# Patient Record
Sex: FEMALE | URBAN - METROPOLITAN AREA
[De-identification: names, ages, dates, MRNs, and addresses within clinical notes are randomized per-mention and may not be internally consistent; named-entity substitution may affect disease eponyms.]

---

## 2017-02-08 ENCOUNTER — IMPORTED ENCOUNTER (OUTPATIENT)
Dept: URBAN - METROPOLITAN AREA CLINIC 1 | Facility: CLINIC | Age: 47
End: 2017-02-08

## 2017-02-08 PROBLEM — H11.151: Noted: 2017-02-08

## 2017-02-08 PROBLEM — H40.013: Noted: 2017-02-08

## 2017-02-08 PROBLEM — H04.123: Noted: 2017-02-08

## 2017-02-08 PROCEDURE — 92014 COMPRE OPH EXAM EST PT 1/>: CPT

## 2017-02-08 PROCEDURE — 92133 CPTRZD OPH DX IMG PST SGM ON: CPT

## 2017-09-06 ENCOUNTER — IMPORTED ENCOUNTER (OUTPATIENT)
Dept: URBAN - METROPOLITAN AREA CLINIC 1 | Facility: CLINIC | Age: 47
End: 2017-09-06

## 2017-09-06 PROBLEM — H04.123: Noted: 2017-09-06

## 2017-09-06 PROBLEM — H40.013: Noted: 2017-09-06

## 2017-09-06 PROCEDURE — 99213 OFFICE O/P EST LOW 20 MIN: CPT

## 2017-09-06 PROCEDURE — 92083 EXTENDED VISUAL FIELD XM: CPT

## 2018-03-14 ENCOUNTER — IMPORTED ENCOUNTER (OUTPATIENT)
Dept: URBAN - METROPOLITAN AREA CLINIC 1 | Facility: CLINIC | Age: 48
End: 2018-03-14

## 2018-03-14 PROBLEM — H11.151: Noted: 2018-03-14

## 2018-03-14 PROBLEM — H40.1231: Noted: 2018-03-14

## 2018-03-14 PROBLEM — H04.123: Noted: 2018-03-14

## 2018-03-14 PROCEDURE — 92133 CPTRZD OPH DX IMG PST SGM ON: CPT

## 2018-03-14 PROCEDURE — 92015 DETERMINE REFRACTIVE STATE: CPT

## 2018-03-14 PROCEDURE — 92014 COMPRE OPH EXAM EST PT 1/>: CPT

## 2018-03-28 ENCOUNTER — IMPORTED ENCOUNTER (OUTPATIENT)
Dept: URBAN - METROPOLITAN AREA CLINIC 1 | Facility: CLINIC | Age: 48
End: 2018-03-28

## 2018-03-28 PROBLEM — H40.1231: Noted: 2018-03-28

## 2018-03-28 PROCEDURE — 76514 ECHO EXAM OF EYE THICKNESS: CPT

## 2018-03-28 PROCEDURE — 99213 OFFICE O/P EST LOW 20 MIN: CPT

## 2018-04-20 ENCOUNTER — IMPORTED ENCOUNTER (OUTPATIENT)
Dept: URBAN - METROPOLITAN AREA CLINIC 1 | Facility: CLINIC | Age: 48
End: 2018-04-20

## 2018-04-20 PROBLEM — H04.123: Noted: 2018-04-20

## 2018-04-20 PROBLEM — H40.1231: Noted: 2018-04-20

## 2018-04-20 PROCEDURE — 99213 OFFICE O/P EST LOW 20 MIN: CPT

## 2019-05-22 ENCOUNTER — IMPORTED ENCOUNTER (OUTPATIENT)
Dept: URBAN - METROPOLITAN AREA CLINIC 1 | Facility: CLINIC | Age: 49
End: 2019-05-22

## 2019-05-22 PROBLEM — H04.123: Noted: 2019-05-22

## 2019-05-22 PROBLEM — H40.1231: Noted: 2019-05-22

## 2019-05-22 PROBLEM — H16.143: Noted: 2019-05-22

## 2019-05-22 PROCEDURE — 92014 COMPRE OPH EXAM EST PT 1/>: CPT

## 2019-05-22 PROCEDURE — 92133 CPTRZD OPH DX IMG PST SGM ON: CPT

## 2019-05-22 PROCEDURE — 92015 DETERMINE REFRACTIVE STATE: CPT

## 2019-09-25 ENCOUNTER — IMPORTED ENCOUNTER (OUTPATIENT)
Dept: URBAN - METROPOLITAN AREA CLINIC 1 | Facility: CLINIC | Age: 49
End: 2019-09-25

## 2019-09-25 PROBLEM — H40.1231: Noted: 2019-09-25

## 2019-09-25 PROCEDURE — 92012 INTRM OPH EXAM EST PATIENT: CPT

## 2019-09-25 PROCEDURE — 92083 EXTENDED VISUAL FIELD XM: CPT

## 2020-01-22 ENCOUNTER — IMPORTED ENCOUNTER (OUTPATIENT)
Dept: URBAN - METROPOLITAN AREA CLINIC 1 | Facility: CLINIC | Age: 50
End: 2020-01-22

## 2020-01-22 PROBLEM — H40.1231: Noted: 2020-01-22

## 2020-01-22 PROBLEM — H04.123: Noted: 2020-01-22

## 2020-01-22 PROBLEM — H11.151: Noted: 2020-01-22

## 2020-01-22 PROCEDURE — 92015 DETERMINE REFRACTIVE STATE: CPT

## 2020-01-22 PROCEDURE — 92012 INTRM OPH EXAM EST PATIENT: CPT

## 2021-08-04 ENCOUNTER — IMPORTED ENCOUNTER (OUTPATIENT)
Dept: URBAN - METROPOLITAN AREA CLINIC 1 | Facility: CLINIC | Age: 51
End: 2021-08-04

## 2021-08-04 PROBLEM — H52.4: Noted: 2021-08-04

## 2021-08-04 PROBLEM — H52.13: Noted: 2021-08-04

## 2021-08-04 PROCEDURE — S0621 ROUTINE OPHTHALMOLOGICAL EXA: HCPCS

## 2022-03-19 PROBLEM — K56.609 SMALL BOWEL OBSTRUCTION (HCC): Status: ACTIVE | Noted: 2021-04-27

## 2022-03-19 PROBLEM — R10.9 ABDOMINAL PAIN OF UNKNOWN ETIOLOGY: Status: ACTIVE | Noted: 2021-04-28

## 2022-03-19 PROBLEM — K56.609 BOWEL OBSTRUCTION (HCC): Status: ACTIVE | Noted: 2018-07-28

## 2022-03-19 PROBLEM — K56.609 SBO (SMALL BOWEL OBSTRUCTION) (HCC): Status: ACTIVE | Noted: 2018-07-28

## 2022-04-09 ASSESSMENT — TONOMETRY
OS_IOP_MMHG: 13
OD_IOP_MMHG: 12
OS_IOP_MMHG: 13
OS_IOP_MMHG: 10
OD_IOP_MMHG: 11
OD_IOP_MMHG: 13
OS_IOP_MMHG: 16
OD_IOP_MMHG: 10
OD_IOP_MMHG: 10
OD_IOP_MMHG: 16
OD_IOP_MMHG: 12
OS_IOP_MMHG: 10
OD_IOP_MMHG: 13
OS_IOP_MMHG: 13
OS_IOP_MMHG: 12
OS_IOP_MMHG: 14
OD_IOP_MMHG: 13
OS_IOP_MMHG: 12

## 2022-04-09 ASSESSMENT — VISUAL ACUITY
OS_SC: 20/30-1
OD_SC: 20/20-1
OD_CC: 20/20-1
OS_CC: 20/30
OD_CC: 20/30
OS_CC: 20/20
OD_CC: J1+
OD_CC: 20/30
OS_CC: J1+
OS_CC: 20/20
OD_SC: 20/20
OD_CC: 20/20
OS_CC: 20/25
OD_CC: 20/20
OS_CC: 20/30
OD_CC: 20/20
OS_SC: 20/20-1
OS_SC: 20/20
OS_CC: 20/20-2
OS_CC: J1+
OD_SC: 20/60
OS_CC: 20/30
OD_CC: J1+
OD_CC: 20/20-2
OD_CC: 20/25
OS_CC: 20/20

## 2023-11-21 ENCOUNTER — HOSPITAL ENCOUNTER (OUTPATIENT)
Facility: HOSPITAL | Age: 53
Setting detail: RECURRING SERIES
Discharge: HOME | End: 2023-11-24
Payer: COMMERCIAL

## 2023-11-21 PROCEDURE — 97161 PT EVAL LOW COMPLEX 20 MIN: CPT

## 2023-11-21 NOTE — PROGRESS NOTES
[address] [place] [address] PHYSICAL THERAPY  [address] [facility] 1, 1 [facility], [address] Phone: [phone] Fax [phone]  Plan of Care / Statement of Necessity for Physical Therapy Services     Patient Name: Davis Monzon : 1970   Medical   Diagnosis: *Chondrocostal junction syndrome (tietze) [M94.0] Treatment Diagnosis: Chondrocostal junction syndrome (tietze) [M94.0]   Onset Date: 2023 Payor: Payor: [employer] / Plan: Andreia [employer]os PPO / Product Type: *No Product type* /    Referral Source: [place] Dears [facility]): 2023   Prior Hospitalization: See medical history Provider #: [id number]   Prior Level of Function: Full functions   Comorbidities: none     Assessment / key information:    SUBJECTIVE  Chief Complaint: Pt reports that she was diagnosed with costochondritis approximately 11 years ago. Pt notes since then she has had flare ups 3-4 times per year, which resolved after a week. Pt reports she experienced a flare up in September, which has continued the last three months. Pt notes the symptoms radiate down her L UE. Pain Level:  Current: 3/10  Best: 1-2/10 during flare ups  Worst: 9/10  Aggravating Factors: Pt notes no specific activity irritates her symptoms. Alleviating Factors: Pt notes she trials ibuprofen and rest. Pt notes nothing has helped her symptoms. Home: Pt lives at home with her  and two children. Pt notes she has been able to perform ADLs, but states they would be supportive if needed. 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 Pt notes her desk isn't physical, but states she had symptoms while lifting some folders in class. Hobbies: Pt enjoys going to the beach, travelling, and hanging out with friends. Sleep: Pt notes disturbed sleeping. Goal: Pt would like to resolve her pain.   PMH: Pt notes no comorbidities, states she had an EKG in September with onset of chest pain which was normal.

## 2023-11-21 NOTE — PROGRESS NOTES
PT DAILY TREATMENT NOTE/CERVICAL [id number]      Patient Name: Kunal Richard    Date: 2023    : 1970  Insurance: Payor: Emelina Orta / Plan: [employer] PPO / Product Type: *No Product type* /      Patient  verified yes     Visit #   Current / Total 1 8-12   Time   In / Out 9:03 9:40   Pain   In / Out 3 2   Subjective Functional Status/Changes: See subj. exam     Treatment Area: Chondrocostal junction syndrome (tietze) [M94.0]    SUBJECTIVE  Chief Complaint: Pt reports that she was diagnosed with costochondritis approximately 11 years ago. Pt notes since then she has had flare ups 3-4 times per year, which resolved after a week. Pt reports she experienced a flare up in September, which has continued the last three months. Pt notes the symptoms radiate down her L UE. Pain Level:  Current: 3/10  Best: 1-2/10 during flare ups  Worst: 9/10  Aggravating Factors: Pt notes no specific activity irritates her symptoms. Alleviating Factors: Pt notes she trials ibuprofen and rest. Pt notes nothing has helped her symptoms. Home: Pt lives at home with her  and two children. Pt notes she has been able to perform ADLs, but states they would be supportive if needed. 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 Pt notes her desk isn't physical, but states she had symptoms while lifting some folders in class. Hobbies: Pt enjoys going to the beach, travelling, and hanging out with friends. Sleep: Pt notes disturbed sleeping. Goal: Pt would like to resolve her pain.   PMH: Pt notes no comorbidities, states she had an EKG in September with onset of chest pain which was normal.    OBJECTIVE/EXAMINATION    Posture: [x] WNL  Head Position: forward head posture  Shoulder/Scapular Position: rounded shoulders, scapula > 5 cm away from midline    T/S Active Movements: [] N/A   [] Too acute   [] Other:  ROM AROM Comments:pain, area   Forward flexion WNL    Extension 75%* Notes \"stretching\" left sided pain of chest